# Patient Record
Sex: MALE | Race: WHITE | NOT HISPANIC OR LATINO | URBAN - METROPOLITAN AREA
[De-identification: names, ages, dates, MRNs, and addresses within clinical notes are randomized per-mention and may not be internally consistent; named-entity substitution may affect disease eponyms.]

---
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
## 2018-06-21 ENCOUNTER — APPOINTMENT (RX ONLY)
Dept: URBAN - METROPOLITAN AREA CLINIC 349 | Facility: CLINIC | Age: 18
Setting detail: DERMATOLOGY
End: 2018-06-21

## 2018-06-21 DIAGNOSIS — L70.0 ACNE VULGARIS: ICD-10-CM | Status: INADEQUATELY CONTROLLED

## 2018-06-21 DIAGNOSIS — L42 PITYRIASIS ROSEA: ICD-10-CM | Status: STABLE

## 2018-06-21 PROBLEM — L20.84 INTRINSIC (ALLERGIC) ECZEMA: Status: ACTIVE | Noted: 2018-06-21

## 2018-06-21 PROCEDURE — ? COUNSELING

## 2018-06-21 PROCEDURE — ? PRESCRIPTION

## 2018-06-21 PROCEDURE — ? TREATMENT REGIMEN

## 2018-06-21 PROCEDURE — 99213 OFFICE O/P EST LOW 20 MIN: CPT

## 2018-06-21 RX ORDER — MINOCYCLINE HYDROCHLORIDE 100 MG/1
CAPSULE ORAL
Qty: 30 | Refills: 2 | Status: ERX

## 2018-06-21 RX ORDER — TRETINOIN 0.6 MG/G
GEL TOPICAL
Qty: 1 | Refills: 2 | Status: ERX | COMMUNITY
Start: 2018-06-21

## 2018-06-21 RX ORDER — CLINDAMYCIN PHOSPHATE AND BENZOYL PEROXIDE 10; 37.5 MG/G; MG/G
GEL TOPICAL
Qty: 1 | Refills: 3 | Status: ERX | COMMUNITY
Start: 2018-06-21

## 2018-06-21 RX ADMIN — CLINDAMYCIN PHOSPHATE AND BENZOYL PEROXIDE: 10; 37.5 GEL TOPICAL at 14:07

## 2018-06-21 RX ADMIN — TRETINOIN: 0.6 GEL TOPICAL at 14:08

## 2018-06-21 ASSESSMENT — LOCATION ZONE DERM
LOCATION ZONE: ARM
LOCATION ZONE: FACE

## 2018-06-21 ASSESSMENT — LOCATION DETAILED DESCRIPTION DERM
LOCATION DETAILED: RIGHT INFERIOR LATERAL MALAR CHEEK
LOCATION DETAILED: RIGHT ANTERIOR PROXIMAL UPPER ARM
LOCATION DETAILED: LEFT ANTERIOR DISTAL UPPER ARM
LOCATION DETAILED: LEFT INFERIOR CENTRAL MALAR CHEEK

## 2018-06-21 ASSESSMENT — LOCATION SIMPLE DESCRIPTION DERM
LOCATION SIMPLE: LEFT UPPER ARM
LOCATION SIMPLE: RIGHT UPPER ARM
LOCATION SIMPLE: LEFT CHEEK
LOCATION SIMPLE: RIGHT CHEEK

## 2018-06-21 NOTE — PROCEDURE: COUNSELING
Birth Control Pills Pregnancy And Lactation Text: This medication should be avoided if pregnant and for the first 30 days post-partum.
Tazorac Pregnancy And Lactation Text: This medication is not safe during pregnancy. It is unknown if this medication is excreted in breast milk.
Benzoyl Peroxide Counseling: Patient counseled that medicine may cause skin irritation and bleach clothing.  In the event of skin irritation, the patient was advised to reduce the amount of the drug applied or use it less frequently.   The patient verbalized understanding of the proper use and possible adverse effects of benzoyl peroxide.  All of the patient's questions and concerns were addressed.
Dapsone Pregnancy And Lactation Text: This medication is Pregnancy Category C and is not considered safe during pregnancy or breast feeding.
Detail Level: Zone
Topical Clindamycin Pregnancy And Lactation Text: This medication is Pregnancy Category B and is considered safe during pregnancy. It is unknown if it is excreted in breast milk.
Azithromycin Counseling:  I discussed with the patient the risks of azithromycin including but not limited to GI upset, allergic reaction, drug rash, diarrhea, and yeast infections.
Isotretinoin Pregnancy And Lactation Text: This medication is Pregnancy Category X and is considered extremely dangerous during pregnancy. It is unknown if it is excreted in breast milk.
Topical Retinoid counseling:  Patient advised to apply a pea-sized amount only at bedtime and wait 30 minutes after washing their face before applying.  If too drying, patient may add a non-comedogenic moisturizer. The patient verbalized understanding of the proper use and possible adverse effects of retinoids.  All of the patient's questions and concerns were addressed.
Doxycycline Counseling:  Patient counseled regarding possible photosensitivity and increased risk for sunburn.  Patient instructed to avoid sunlight, if possible.  When exposed to sunlight, patients should wear protective clothing, sunglasses, and sunscreen.  The patient was instructed to call the office immediately if the following severe adverse effects occur:  hearing changes, easy bruising/bleeding, severe headache, or vision changes.  The patient verbalized understanding of the proper use and possible adverse effects of doxycycline.  All of the patient's questions and concerns were addressed.
Topical Retinoid Pregnancy And Lactation Text: This medication is Pregnancy Category C. It is unknown if this medication is excreted in breast milk.
Spironolactone Counseling: Patient advised regarding risks of diarrhea, abdominal pain, hyperkalemia, birth defects (for female patients), liver toxicity and renal toxicity. The patient may need blood work to monitor liver and kidney function and potassium levels while on therapy. The patient verbalized understanding of the proper use and possible adverse effects of spironolactone.  All of the patient's questions and concerns were addressed.
Bactrim Counseling:  I discussed with the patient the risks of sulfa antibiotics including but not limited to GI upset, allergic reaction, drug rash, diarrhea, dizziness, photosensitivity, and yeast infections.  Rarely, more serious reactions can occur including but not limited to aplastic anemia, agranulocytosis, methemoglobinemia, blood dyscrasias, liver or kidney failure, lung infiltrates or desquamative/blistering drug rashes.
Minocycline Counseling: Patient advised regarding possible photosensitivity and discoloration of the teeth, skin, lips, tongue and gums.  Patient instructed to avoid sunlight, if possible.  When exposed to sunlight, patients should wear protective clothing, sunglasses, and sunscreen.  The patient was instructed to call the office immediately if the following severe adverse effects occur:  hearing changes, easy bruising/bleeding, severe headache, or vision changes.  The patient verbalized understanding of the proper use and possible adverse effects of minocycline.  All of the patient's questions and concerns were addressed.
Spironolactone Pregnancy And Lactation Text: This medication can cause feminization of the male fetus and should be avoided during pregnancy. The active metabolite is also found in breast milk.
Tetracycline Pregnancy And Lactation Text: This medication is Pregnancy Category D and not consider safe during pregnancy. It is also excreted in breast milk.
Birth Control Pills Counseling: Birth Control Pill Counseling: I discussed with the patient the potential side effects of OCPs including but not limited to increased risk of stroke, heart attack, thrombophlebitis, deep venous thrombosis, hepatic adenomas, breast changes, GI upset, headaches, and depression.  The patient verbalized understanding of the proper use and possible adverse effects of OCPs. All of the patient's questions and concerns were addressed.
Use Enhanced Medication Counseling?: No
Topical Sulfur Applications Counseling: Topical Sulfur Counseling: Patient counseled that this medication may cause skin irritation or allergic reactions.  In the event of skin irritation, the patient was advised to reduce the amount of the drug applied or use it less frequently.   The patient verbalized understanding of the proper use and possible adverse effects of topical sulfur application.  All of the patient's questions and concerns were addressed.
Tetracycline Counseling: Patient counseled regarding possible photosensitivity and increased risk for sunburn.  Patient instructed to avoid sunlight, if possible.  When exposed to sunlight, patients should wear protective clothing, sunglasses, and sunscreen.  The patient was instructed to call the office immediately if the following severe adverse effects occur:  hearing changes, easy bruising/bleeding, severe headache, or vision changes.  The patient verbalized understanding of the proper use and possible adverse effects of tetracycline.  All of the patient's questions and concerns were addressed. Patient understands to avoid pregnancy while on therapy due to potential birth defects.
Topical Sulfur Applications Pregnancy And Lactation Text: This medication is Pregnancy Category C and has an unknown safety profile during pregnancy. It is unknown if this topical medication is excreted in breast milk.
Dapsone Counseling: I discussed with the patient the risks of dapsone including but not limited to hemolytic anemia, agranulocytosis, rashes, methemoglobinemia, kidney failure, peripheral neuropathy, headaches, GI upset, and liver toxicity.  Patients who start dapsone require monitoring including baseline LFTs and weekly CBCs for the first month, then every month thereafter.  The patient verbalized understanding of the proper use and possible adverse effects of dapsone.  All of the patient's questions and concerns were addressed.
High Dose Vitamin A Counseling: Side effects reviewed, pt to contact office should one occur.
Doxycycline Pregnancy And Lactation Text: This medication is Pregnancy Category D and not consider safe during pregnancy. It is also excreted in breast milk but is considered safe for shorter treatment courses.
High Dose Vitamin A Pregnancy And Lactation Text: High dose vitamin A therapy is contraindicated during pregnancy and breast feeding.
Topical Clindamycin Counseling: Patient counseled that this medication may cause skin irritation or allergic reactions.  In the event of skin irritation, the patient was advised to reduce the amount of the drug applied or use it less frequently.   The patient verbalized understanding of the proper use and possible adverse effects of clindamycin.  All of the patient's questions and concerns were addressed.
Benzoyl Peroxide Pregnancy And Lactation Text: This medication is Pregnancy Category C. It is unknown if benzoyl peroxide is excreted in breast milk.
Azithromycin Pregnancy And Lactation Text: This medication is considered safe during pregnancy and is also secreted in breast milk.
Erythromycin Counseling:  I discussed with the patient the risks of erythromycin including but not limited to GI upset, allergic reaction, drug rash, diarrhea, increase in liver enzymes, and yeast infections.
Tazorac Counseling:  Patient advised that medication is irritating and drying.  Patient may need to apply sparingly and wash off after an hour before eventually leaving it on overnight.  The patient verbalized understanding of the proper use and possible adverse effects of tazorac.  All of the patient's questions and concerns were addressed.
Isotretinoin Counseling: Patient should get monthly blood tests, not donate blood, not drive at night if vision affected, not share medication, and not undergo elective surgery for 6 months after tx completed. Side effects reviewed, pt to contact office should one occur.
Erythromycin Pregnancy And Lactation Text: This medication is Pregnancy Category B and is considered safe during pregnancy. It is also excreted in breast milk.
Bactrim Pregnancy And Lactation Text: This medication is Pregnancy Category D and is known to cause fetal risk.  It is also excreted in breast milk.

## 2018-08-15 ENCOUNTER — APPOINTMENT (RX ONLY)
Dept: URBAN - METROPOLITAN AREA CLINIC 349 | Facility: CLINIC | Age: 18
Setting detail: DERMATOLOGY
End: 2018-08-15

## 2018-08-15 DIAGNOSIS — L42 PITYRIASIS ROSEA: ICD-10-CM

## 2018-08-15 DIAGNOSIS — L70.0 ACNE VULGARIS: ICD-10-CM

## 2018-08-15 PROCEDURE — ? PRESCRIPTION

## 2018-08-15 PROCEDURE — ? TREATMENT REGIMEN

## 2018-08-15 PROCEDURE — 99213 OFFICE O/P EST LOW 20 MIN: CPT

## 2018-08-15 PROCEDURE — ? COUNSELING

## 2018-08-15 RX ORDER — CLINDAMYCIN PHOSPHATE AND BENZOYL PEROXIDE 10; 37.5 MG/G; MG/G
GEL TOPICAL
Qty: 1 | Refills: 3 | Status: ERX

## 2018-08-15 RX ORDER — TRETINOIN 0.6 MG/G
GEL TOPICAL
Qty: 1 | Refills: 2 | Status: ERX

## 2018-08-15 RX ORDER — MINOCYCLINE HYDROCHLORIDE 100 MG/1
CAPSULE ORAL
Qty: 30 | Refills: 2 | Status: ERX

## 2018-08-15 ASSESSMENT — LOCATION SIMPLE DESCRIPTION DERM
LOCATION SIMPLE: LEFT UPPER ARM
LOCATION SIMPLE: RIGHT CHEEK
LOCATION SIMPLE: LEFT CHEEK
LOCATION SIMPLE: RIGHT UPPER ARM

## 2018-08-15 ASSESSMENT — LOCATION DETAILED DESCRIPTION DERM
LOCATION DETAILED: LEFT ANTERIOR DISTAL UPPER ARM
LOCATION DETAILED: RIGHT INFERIOR LATERAL MALAR CHEEK
LOCATION DETAILED: RIGHT ANTERIOR PROXIMAL UPPER ARM
LOCATION DETAILED: LEFT INFERIOR CENTRAL MALAR CHEEK

## 2018-08-15 ASSESSMENT — LOCATION ZONE DERM
LOCATION ZONE: FACE
LOCATION ZONE: ARM

## 2018-08-15 NOTE — PROCEDURE: COUNSELING
Tazorac Pregnancy And Lactation Text: This medication is not safe during pregnancy. It is unknown if this medication is excreted in breast milk.
Topical Sulfur Applications Counseling: Topical Sulfur Counseling: Patient counseled that this medication may cause skin irritation or allergic reactions.  In the event of skin irritation, the patient was advised to reduce the amount of the drug applied or use it less frequently.   The patient verbalized understanding of the proper use and possible adverse effects of topical sulfur application.  All of the patient's questions and concerns were addressed.
Minocycline Pregnancy And Lactation Text: This medication is Pregnancy Category D and not consider safe during pregnancy. It is also excreted in breast milk.
Minocycline Counseling: Patient advised regarding possible photosensitivity and discoloration of the teeth, skin, lips, tongue and gums.  Patient instructed to avoid sunlight, if possible.  When exposed to sunlight, patients should wear protective clothing, sunglasses, and sunscreen.  The patient was instructed to call the office immediately if the following severe adverse effects occur:  hearing changes, easy bruising/bleeding, severe headache, or vision changes.  The patient verbalized understanding of the proper use and possible adverse effects of minocycline.  All of the patient's questions and concerns were addressed.
Azithromycin Counseling:  I discussed with the patient the risks of azithromycin including but not limited to GI upset, allergic reaction, drug rash, diarrhea, and yeast infections.
Use Enhanced Medication Counseling?: No
Spironolactone Pregnancy And Lactation Text: This medication can cause feminization of the male fetus and should be avoided during pregnancy. The active metabolite is also found in breast milk.
Tazorac Counseling:  Patient advised that medication is irritating and drying.  Patient may need to apply sparingly and wash off after an hour before eventually leaving it on overnight.  The patient verbalized understanding of the proper use and possible adverse effects of tazorac.  All of the patient's questions and concerns were addressed.
Erythromycin Pregnancy And Lactation Text: This medication is Pregnancy Category B and is considered safe during pregnancy. It is also excreted in breast milk.
Bactrim Pregnancy And Lactation Text: This medication is Pregnancy Category D and is known to cause fetal risk.  It is also excreted in breast milk.
Detail Level: Zone
Azithromycin Pregnancy And Lactation Text: This medication is considered safe during pregnancy and is also secreted in breast milk.
Dapsone Pregnancy And Lactation Text: This medication is Pregnancy Category C and is not considered safe during pregnancy or breast feeding.
Erythromycin Counseling:  I discussed with the patient the risks of erythromycin including but not limited to GI upset, allergic reaction, drug rash, diarrhea, increase in liver enzymes, and yeast infections.
High Dose Vitamin A Counseling: Side effects reviewed, pt to contact office should one occur.
Topical Retinoid Pregnancy And Lactation Text: This medication is Pregnancy Category C. It is unknown if this medication is excreted in breast milk.
Topical Retinoid counseling:  Patient advised to apply a pea-sized amount only at bedtime and wait 30 minutes after washing their face before applying.  If too drying, patient may add a non-comedogenic moisturizer. The patient verbalized understanding of the proper use and possible adverse effects of retinoids.  All of the patient's questions and concerns were addressed.
Isotretinoin Pregnancy And Lactation Text: This medication is Pregnancy Category X and is considered extremely dangerous during pregnancy. It is unknown if it is excreted in breast milk.
High Dose Vitamin A Pregnancy And Lactation Text: High dose vitamin A therapy is contraindicated during pregnancy and breast feeding.
Topical Sulfur Applications Pregnancy And Lactation Text: This medication is Pregnancy Category C and has an unknown safety profile during pregnancy. It is unknown if this topical medication is excreted in breast milk.
Doxycycline Pregnancy And Lactation Text: This medication is Pregnancy Category D and not consider safe during pregnancy. It is also excreted in breast milk but is considered safe for shorter treatment courses.
Dapsone Counseling: I discussed with the patient the risks of dapsone including but not limited to hemolytic anemia, agranulocytosis, rashes, methemoglobinemia, kidney failure, peripheral neuropathy, headaches, GI upset, and liver toxicity.  Patients who start dapsone require monitoring including baseline LFTs and weekly CBCs for the first month, then every month thereafter.  The patient verbalized understanding of the proper use and possible adverse effects of dapsone.  All of the patient's questions and concerns were addressed.
Topical Clindamycin Counseling: Patient counseled that this medication may cause skin irritation or allergic reactions.  In the event of skin irritation, the patient was advised to reduce the amount of the drug applied or use it less frequently.   The patient verbalized understanding of the proper use and possible adverse effects of clindamycin.  All of the patient's questions and concerns were addressed.
Doxycycline Counseling:  Patient counseled regarding possible photosensitivity and increased risk for sunburn.  Patient instructed to avoid sunlight, if possible.  When exposed to sunlight, patients should wear protective clothing, sunglasses, and sunscreen.  The patient was instructed to call the office immediately if the following severe adverse effects occur:  hearing changes, easy bruising/bleeding, severe headache, or vision changes.  The patient verbalized understanding of the proper use and possible adverse effects of doxycycline.  All of the patient's questions and concerns were addressed.
Benzoyl Peroxide Counseling: Patient counseled that medicine may cause skin irritation and bleach clothing.  In the event of skin irritation, the patient was advised to reduce the amount of the drug applied or use it less frequently.   The patient verbalized understanding of the proper use and possible adverse effects of benzoyl peroxide.  All of the patient's questions and concerns were addressed.
Benzoyl Peroxide Pregnancy And Lactation Text: This medication is Pregnancy Category C. It is unknown if benzoyl peroxide is excreted in breast milk.
Topical Clindamycin Pregnancy And Lactation Text: This medication is Pregnancy Category B and is considered safe during pregnancy. It is unknown if it is excreted in breast milk.
Isotretinoin Counseling: Patient should get monthly blood tests, not donate blood, not drive at night if vision affected, not share medication, and not undergo elective surgery for 6 months after tx completed. Side effects reviewed, pt to contact office should one occur.
Birth Control Pills Counseling: Birth Control Pill Counseling: I discussed with the patient the potential side effects of OCPs including but not limited to increased risk of stroke, heart attack, thrombophlebitis, deep venous thrombosis, hepatic adenomas, breast changes, GI upset, headaches, and depression.  The patient verbalized understanding of the proper use and possible adverse effects of OCPs. All of the patient's questions and concerns were addressed.
Spironolactone Counseling: Patient advised regarding risks of diarrhea, abdominal pain, hyperkalemia, birth defects (for female patients), liver toxicity and renal toxicity. The patient may need blood work to monitor liver and kidney function and potassium levels while on therapy. The patient verbalized understanding of the proper use and possible adverse effects of spironolactone.  All of the patient's questions and concerns were addressed.
Bactrim Counseling:  I discussed with the patient the risks of sulfa antibiotics including but not limited to GI upset, allergic reaction, drug rash, diarrhea, dizziness, photosensitivity, and yeast infections.  Rarely, more serious reactions can occur including but not limited to aplastic anemia, agranulocytosis, methemoglobinemia, blood dyscrasias, liver or kidney failure, lung infiltrates or desquamative/blistering drug rashes.
Tetracycline Counseling: Patient counseled regarding possible photosensitivity and increased risk for sunburn.  Patient instructed to avoid sunlight, if possible.  When exposed to sunlight, patients should wear protective clothing, sunglasses, and sunscreen.  The patient was instructed to call the office immediately if the following severe adverse effects occur:  hearing changes, easy bruising/bleeding, severe headache, or vision changes.  The patient verbalized understanding of the proper use and possible adverse effects of tetracycline.  All of the patient's questions and concerns were addressed. Patient understands to avoid pregnancy while on therapy due to potential birth defects.
Birth Control Pills Pregnancy And Lactation Text: This medication should be avoided if pregnant and for the first 30 days post-partum.

## 2018-09-19 LAB
CHOLEST SERPL-MCNC: 166 MG/DL (ref 100–169)
CHOLEST/HDLC SERPL: 4.3 RATIO (ref 0–5)
GLUCOSE SERPL-MCNC: 87 MG/DL (ref 65–99)
HBA1C MFR BLD: 5.4 % (ref 4.8–5.6)
HDLC SERPL-MCNC: 39 MG/DL
LDLC SERPL CALC-MCNC: 113 MG/DL (ref 0–109)
TRIGL SERPL-MCNC: 69 MG/DL (ref 0–89)
VLDLC SERPL CALC-MCNC: 14 MG/DL (ref 5–40)

## 2020-05-28 ENCOUNTER — APPOINTMENT (RX ONLY)
Dept: URBAN - METROPOLITAN AREA CLINIC 349 | Facility: CLINIC | Age: 20
Setting detail: DERMATOLOGY
End: 2020-05-28

## 2020-05-28 DIAGNOSIS — L73.8 OTHER SPECIFIED FOLLICULAR DISORDERS: ICD-10-CM

## 2020-05-28 PROBLEM — L70.0 ACNE VULGARIS: Status: ACTIVE | Noted: 2020-05-28

## 2020-05-28 PROCEDURE — ? TREATMENT REGIMEN

## 2020-05-28 PROCEDURE — ? PRESCRIPTION

## 2020-05-28 PROCEDURE — 99213 OFFICE O/P EST LOW 20 MIN: CPT

## 2020-05-28 PROCEDURE — ? COUNSELING

## 2020-05-28 RX ORDER — CLINDAMYCIN PHOSPHATE 10 MG/ML
SOLUTION TOPICAL
Qty: 1 | Refills: 2 | Status: ERX | COMMUNITY
Start: 2020-05-28

## 2020-05-28 RX ADMIN — CLINDAMYCIN PHOSPHATE: 10 SOLUTION TOPICAL at 00:00

## 2020-05-28 ASSESSMENT — LOCATION ZONE DERM: LOCATION ZONE: TRUNK

## 2020-05-28 ASSESSMENT — LOCATION SIMPLE DESCRIPTION DERM: LOCATION SIMPLE: GROIN

## 2020-05-28 ASSESSMENT — LOCATION DETAILED DESCRIPTION DERM: LOCATION DETAILED: SUPRAPUBIC SKIN

## 2021-01-04 ENCOUNTER — HOSPITAL ENCOUNTER (OUTPATIENT)
Dept: PHYSICAL THERAPY | Age: 21
Discharge: HOME | End: 2021-01-04
Payer: COMMERCIAL

## 2021-01-04 PROCEDURE — 97110 THERAPEUTIC EXERCISES: CPT

## 2021-01-04 PROCEDURE — 97162 PT EVAL MOD COMPLEX 30 MIN: CPT

## 2021-01-04 NOTE — THERAPY EVALUATION
Edmundo Andrews : 2000 Primary: [facility]* Secondary:  [address] @ [address], [address] [place] [address] [phone]   [phone] OUTPATIENT PHYSICAL THERAPY:Initial Assessment 2021 ICD-10: Treatment Diagnosis: Pain in right hip (M25.551), Stiffness of right hip, not elsewhere classified (M25.651), Pain in left hip (M25.552), Stiffness of left hip, not elsewhere classified (M25.652), Pain in right knee (M25.561) and Pain in left knee (M25.562) Precautions/Allergies:  
Patient has no allergy information on record. Ambulatory/Rehab Services H2 Model Falls Risk Assessment Risk Factors: 
     (1)  Gender [Male] Ability to Rise from Chair: 
     (0)  Ability to rise in a single movement Falls Prevention Plan: No modifications necessary Total: (5 or greater = High Risk): 1  [facility] [place] [employer] Patent #[id number]. Federal Law prohibits the replication, distribution or use without written permission from [facility] [employer] MEDICAL/REFERRING DIAGNOSIS: 
Pain in right knee [M25.561] Pain in left knee [M25.562] DATE OF ONSET: 20 REFERRING PHYSICIAN: Ponce Murdock MD MD Orders: evaluate and treat Return MD Appointment: TBD INITIAL ASSESSMENT:  Mr. Samuel Pablo presents with impaired strength, ROM and pain of bilateral hips and knees, L more painful than R . This limits sitting, lifting and ambulatory tolerance and restricts ability to participate in ADLs, functional mobility and recreational activities. . Patient would benefit from skilled physical therapy to address above impairments. PROBLEM LIST (Impacting functional limitations): 1. Decreased Strength 2. Decreased ADL/Functional Activities 3. Increased Pain 4. Decreased Activity Tolerance 5. Decreased Flexibility/Joint Mobility INTERVENTIONS PLANNED: (Treatment may consist of any combination of the following) 1. Cryotherapy 2. Electrical Stimulation 3. Gait Training 4. Heat 5. Home Exercise Program (HEP) 6. Manual Therapy 7. Neuromuscular Re-education/Strengthening 8. Therapeutic Activites 9. Therapeutic Exercise/Strengthening TREATMENT PLAN: 
Effective Dates: 1/4/2021 TO 2/3/2021 (30 days). Frequency/Duration: 2 times a week for 30 Day(s) GOALS: (Goals have been discussed and agreed upon with patient.) Short-Term Functional Goals: Time Frame: 2 weeks # Goal Status 1 Pt will confirm compliance with home program to facilitate improvement in function. NEW Discharge goals: Time frame: 4 weeks # Goal Status 1 Pt will be able to tolerate sitting for at least 1 hour with no significant increase in symptoms to participate in ADLs such as desk work and reading. NEW  
2 Pt will be able to pick objects up from ground without significant increase in symptoms to be able to clean. NEW  
3 Pt will tolerate all LE active movements with no pain to be able to perform ADLs and functional mobility without limitations. NEW Rehabilitation Potential For Stated Goals: Excellent Regarding Jose Mckeon's therapy, I certify that the treatment plan above will be carried out by a therapist or under their direction. Thank you for this referral, Vanessa Patel, PT, DPT Referring Physician Signature: Collette Bourne MD _______________________________ Date _____________ HISTORY:  
History of Injury/Illness (Reason for Referral): 
 Pt reports around Thanksgiving 2020, he had to move a lot of furniture for his family since they were having floors re-done. He reports after this, both of his hip and knees hurt. He reports pain is in medial hips near groin and then medial aspect of thigh then anterior knee. He reports it generally switches from leg to leg with L leg hurting more today. He had x-rays which were (-) and checked for hernia which was also (-). He sometimes will pop hips by stretching into flexion then extension which feels good and relieves tension. He reports most pain is from prolonged sitting. He denies radicular symptoms or symptoms of hip catching or locking. He would like to get back to normal mobility and ADLs as well as being able to run 1-2/week. Past Medical History/Comorbidities:  
Mr. Julio Cerna  has no past medical history on file. Mr. Julio Cerna  has no past surgical history on file. Social History/Living Environment:  
  Pt lives with family in two-story home with stairs in home. Prior Level of Function/Work/Activity: 
Pt is a student. Pt had unrestricted prior level of function. Current Medications: No current outpatient medications on file. Date Last Reviewed:  1/4/2021 Number of Personal Factors/Comorbidities that affect the Plan of Care: 1-2: MODERATE COMPLEXITY EXAMINATION:  
Pain at worst: 5/10 Observation: Gait: WNL and no antalgic gait ROM: B knee flexion and extension WNL. B hip flexion and extension WNL, pain with B JANETTE and mild pain on R with hip IR in extension. Lower body ANDT: All tests WNL B Strength: 5/5 throughout though mild pain with B knee extension and B hip IR. Pt reported SLR had a slight ache to it. Pt had B knee pain with deep squat. Palpation: B patella mobility WNL. Body Structures Involved: 1. Joints 2. Muscles Body Functions Affected: 1. Sensory/Pain 2. Neuromusculoskeletal 
3. Movement Related Activities and Participation Affected: 1. General Tasks and Demands 2. Mobility 3. Self Care 4. Community, Social and [place] Number of elements (examined above) that affect the Plan of Care: 3: MODERATE COMPLEXITY CLINICAL PRESENTATION:  
Presentation: Evolving clinical presentation with changing clinical characteristics: MODERATE COMPLEXITY CLINICAL DECISION MAKING:  
  
OUTCOME MEASURE:  
Initial outcome measure performed on 1/4/2021. Tool Used: Lower Extremity Functional Scale (LEFS) Score:  Initial: 70/80 Most Recent: X/80 (Date: -- ) Interpretation of Score: 20 questions each scored on a 5 point scale with 0 representing \"extreme difficulty or unable to perform\" and 4 representing \"no difficulty\". The lower the score, the greater the functional disability. 80/80 represents no disability. Minimal detectable change is 9 points. MEDICAL NECESSITY:  
· Patient will benefit from skilled physical therapy to address their previously listed impairments in order to improve their independence with functional activities painfree. REASON FOR SERVICES/OTHER COMMENTS: 
· Patient requires present interventions due to patient's inability to perform functional and recreational activities painfree. Use of outcome tool(s) and clinical judgement create a POC that gives a: Questionable prediction of patient's progress: MODERATE COMPLEXITY Total Duration: 39 min PT Patient Time In/Time Out Time In: 8267 Time Out: 1843 Jeniffer García, PT, DPT

## 2021-01-04 NOTE — PROGRESS NOTES
Elin De La Rosa  : 2000  [facility]  Secondary:  [address] @ [address]  [phone]   [phone]      OUTPATIENT PHYSICAL THERAPY: Daily Treatment Note 2021  Visit Count:  1    ICD-10: Treatment Diagnosis: Pain in right hip (M25.551), Stiffness of right hip, not elsewhere classified (M25.651), Pain in left hip (M25.552), Stiffness of left hip, not elsewhere classified (M25.652), Pain in right knee (M25.561) and Pain in left knee (M25.562)  TREATMENT PLAN:  Effective Dates: 2021 TO 2/3/2021 (30 days). Frequency/Duration: 2 times a week for 30 Day(s)  GOALS: (Goals have been discussed and agreed upon with patient.)  Short-Term Functional Goals: Time Frame: 2 weeks  # Goal Status   1 Pt will confirm compliance with home program to facilitate improvement in function. NEW     Discharge goals: Time frame: 4 weeks   # Goal Status   1 Pt will be able to tolerate sitting for at least 1 hour with no significant increase in symptoms to participate in ADLs such as desk work and reading. NEW   2 Pt will be able to pick objects up from ground without significant increase in symptoms to be able to clean. NEW   3 Pt will tolerate all LE active movements with no pain to be able to perform ADLs and functional mobility without limitations. NEW       Pre-treatment Symptoms/Complaints:  Pt reports mild pain affecting L hip and anterior knee right now. Pain: Initial:   310 Post Session:  No increase   Medications Last Reviewed: 2021  Updated Objective Findings: Below measures from initial evaluation unless otherwise noted. 21  Pain at worst: 5/10  Observation: Gait: WNL and no antalgic gait  ROM: B knee flexion and extension WNL. B hip flexion and extension WNL, pain with B JANETTE and mild pain on R with hip IR in extension.    Lower body ANDT: All tests WNL B  Strength: 5/5 throughout though mild pain with B knee extension and B hip IR. Pt reported SLR had a slight ache to it. Pt had B knee pain with deep squat. Palpation: B patella mobility WNL. LEFS: 70/80    TREATMENT:   GAIT TRAINING: (see chart for minutes):  Gait training to improve and/or restore physical functioning as related to mobility and balance. THERAPEUTIC ACTIVITY: (see chart for minutes): Therapeutic activities per grid below to improve mobility, strength, balance and coordination. Required minimal visual, verbal and tactile cues to improve independence with functional mobility and activities. THERAPEUTIC EXERCISE: (see chart for minutes):  Exercises per grid below to improve mobility, strength, balance and coordination. Required minimal visual, verbal and tactile cues to promote proper body alignment and promote proper body mechanics. Progressed resistance, range, repetitions and complexity of movement as indicated. NEUROMUSCULAR RE-EDUCATION: (see chart for minutes):  Exercise/activities per grid below to improve balance, coordination, kinesthetic sense, posture, pain, and proprioception. Required minimal visual, verbal and tactile cues to promote motor control of bilateral, lower extremity(s). MANUAL THERAPY: (see chart for minutes): Joint mobilization, Soft tissue mobilization, skin mobilization, and muscle energy techniques were utilized and necessary because of the patient's restricted joint motion, restricted motion of soft tissue and pain . MODALITIES: (see chart for minutes):      *  Cold Pack Therapy in order to provide analgesia.      Date: 1/4/21 (visit 1)       Modalities:                Therapeutic Exercise: 25 min        SLR: x5 B, x10 B  Bridge: x5, x10   SKTC: x1 hold B   Hip abduction: x5, x10 B  Reverse clam: x5 B  Active lunge stretch: x3 B  Hip extension: x5   Hip adductor stretch: x1 hold B   Pt educated on taking standing breaks from prolonged sitting  Pt educated on home program implementation and given printout. Neuromuscular re-education                Manual Therapy:                Therapeutic Activities:                  Home program: 1/4/20: bridge, hip abduction, active lunge stretch, hip adductor stretch, SKTC    [employer] Portal  Treatment/Session Summary:    · Response to Treatment: Pt tolerated exercises and reported they were moderately difficult. · Communication/Consultation:  None today  · Equipment provided today: None  · Recommendations/Intent for next treatment session: Next visit will focus on improving B hip and knee pain and strength.     Total Treatment Billable Duration:  39 minutes  PT Patient Time In/Time Out  Time In: 1450  Time Out: 622 [address], PT, DPT    Future Appointments   Date Time Provider Andreina Anaya   1/7/2021  2:00 PM Chris Blackwell [facility]   1/12/2021  2:00 PM Chris Blackwell [facility]   1/14/2021  2:00 PM Chris Blackwell [facility]   1/19/2021  2:00 PM Dallas Rosalva [facility]   1/21/2021  2:00 PM Dallas Rosalva [facility]   1/26/2021  2:00 PM Dallas Rosalva [facility]   1/28/2021  2:00 PM Dallas Rosalva [facility]

## 2021-01-07 ENCOUNTER — HOSPITAL ENCOUNTER (OUTPATIENT)
Dept: PHYSICAL THERAPY | Age: 21
Discharge: HOME | End: 2021-01-07
Payer: COMMERCIAL

## 2021-01-07 PROCEDURE — 97110 THERAPEUTIC EXERCISES: CPT

## 2021-01-12 ENCOUNTER — HOSPITAL ENCOUNTER (OUTPATIENT)
Dept: PHYSICAL THERAPY | Age: 21
Discharge: HOME | End: 2021-01-12
Payer: COMMERCIAL

## 2021-01-12 PROCEDURE — 97110 THERAPEUTIC EXERCISES: CPT

## 2021-01-12 NOTE — PROGRESS NOTES
Roberto Gonsalez  : 2000  [facility]  Secondary:  [address] @ [address], [address] [place] [address]  [phone]   [phone]      OUTPATIENT PHYSICAL THERAPY: Daily Treatment Note 2021  Visit Count:  3    ICD-10: Treatment Diagnosis: Pain in right hip (M25.551), Stiffness of right hip, not elsewhere classified (M25.651), Pain in left hip (M25.552), Stiffness of left hip, not elsewhere classified (M25.652), Pain in right knee (M25.561) and Pain in left knee (M25.562)  TREATMENT PLAN:  Effective Dates: 2021 TO 2/3/2021 (30 days). Frequency/Duration: 2 times a week for 30 Day(s)  GOALS: (Goals have been discussed and agreed upon with patient.)  Short-Term Functional Goals: Time Frame: 2 weeks  # Goal Status   1 Pt will confirm compliance with home program to facilitate improvement in function. NEW     Discharge goals: Time frame: 4 weeks   # Goal Status   1 Pt will be able to tolerate sitting for at least 1 hour with no significant increase in symptoms to participate in ADLs such as desk work and reading. NEW   2 Pt will be able to pick objects up from ground without significant increase in symptoms to be able to clean. NEW   3 Pt will tolerate all LE active movements with no pain to be able to perform ADLs and functional mobility without limitations. NEW       Pre-treatment Symptoms/Complaints:  Pt reports he can tell improvement in his hips. He reports the area that they hurt is in reduced to a smaller region though it is more intense in this area. Pain: Initial:   Pt reported hips feeling okay at rest Post Session:  No increase   Medications Last Reviewed: 2021  Updated Objective Findings: Below measures from initial evaluation unless otherwise noted. 21  Pain at worst: 5/10  Observation: Gait: WNL and no antalgic gait  ROM: B knee flexion and extension WNL.  B hip flexion and extension WNL, pain with B JANETTE and mild pain on R with hip IR in extension. Lower body ANDT: All tests WNL B  Strength: 5/5 throughout though mild pain with B knee extension and B hip IR. Pt reported SLR had a slight ache to it. Pt had B knee pain with deep squat. Palpation: B patella mobility WNL. LEFS: 70/80    TREATMENT:   GAIT TRAINING: (see chart for minutes):  Gait training to improve and/or restore physical functioning as related to mobility and balance. THERAPEUTIC ACTIVITY: (see chart for minutes): Therapeutic activities per grid below to improve mobility, strength, balance and coordination. Required minimal visual, verbal and tactile cues to improve independence with functional mobility and activities. THERAPEUTIC EXERCISE: (see chart for minutes):  Exercises per grid below to improve mobility, strength, balance and coordination. Required minimal visual, verbal and tactile cues to promote proper body alignment and promote proper body mechanics. Progressed resistance, range, repetitions and complexity of movement as indicated. NEUROMUSCULAR RE-EDUCATION: (see chart for minutes):  Exercise/activities per grid below to improve balance, coordination, kinesthetic sense, posture, pain, and proprioception. Required minimal visual, verbal and tactile cues to promote motor control of bilateral, lower extremity(s). MANUAL THERAPY: (see chart for minutes): Joint mobilization, Soft tissue mobilization, skin mobilization, and muscle energy techniques were utilized and necessary because of the patient's restricted joint motion, restricted motion of soft tissue and pain . MODALITIES: (see chart for minutes):      *  Cold Pack Therapy in order to provide analgesia.      Date: 1/4/21 (visit 1) 1/7/21 (visit 2)  1/12/21 (visit 3)      Modalities:                Therapeutic Exercise: 25 min 40 min 41 min      SLR: x5 B, x10 B  Bridge: x5, x10   SKTC: x1 hold B   Hip abduction: x5, x10 B  Reverse clam: x5 B  Active lunge stretch: x3 B  Hip extension: x5   Hip adductor stretch: x1 hold B   Pt educated on taking standing breaks from prolonged sitting  Pt educated on home program implementation and given printout. Bike: 5'   SLR: x10 B  Bridge: x10  SL bridge: x5 B  Marching bridge: x5   Hip abduction: 2x10 B   Dead lift: 2x5x65#  Sumo DL: 2x5x65# with cues to 'spread the floor' which he reported felt better  Pt educated on lifting mechanics including holding the weight close. SL RDL: 2x4 B   Band walk with 13/16 band: 2x1 lap each with knees straight, knees bent, and monster walk Bike: 5'   SLR: x10 B  Bridge: x10  Marching bridge: 2x6   Hip abduction: x10 B   Side plank from knees: x5 B, x3 with 3 reps of clam each. Pt educated to add to home program.  Dead lift: x5x65#, 5x65# with squat lift, 5x65# with snatch   Sumo DL: 5x65# with cues to 'spread the floor'   SL RDL: 2x5 B   Side lunge: x5 B, x5 with slider  Hip adductor stretch: x2 contract relax in seated     Neuromuscular re-education                Manual Therapy:                Therapeutic Activities:                  Home program: 1/4/20: bridge, hip abduction, active lunge stretch, hip adductor stretch, SKTC    [facility] Portal  Treatment/Session Summary:    · Response to Treatment: Pt progressing well and advanced to side plank + clam and side lunges. He was educated on additional lifting strategies. · Communication/Consultation:  None today  · Equipment provided today: None  · Recommendations/Intent for next treatment session: Next visit will focus on improving B hip and knee pain and strength.     Total Treatment Billable Duration:  41 minutes  PT Patient Time In/Time Out  Time In: 7205  Time Out: Gurjit Mott PT, DPT    Future Appointments   Date Time Provider Andreina Anaya   1/14/2021  2:00 PM Joseph Cameron [facility]   1/19/2021  2:00 PM Joseph TYLER [facility]   1/21/2021  2:00 PM Joseph TYLER [facility]   1/26/2021  2:00 PM Joseph DAVEMATTHIEU [facility]   1/28/2021  2:00 PM Stacie Thompson [facility]

## 2021-01-14 ENCOUNTER — HOSPITAL ENCOUNTER (OUTPATIENT)
Dept: PHYSICAL THERAPY | Age: 21
Discharge: HOME | End: 2021-01-14
Payer: COMMERCIAL

## 2021-01-14 PROCEDURE — 97110 THERAPEUTIC EXERCISES: CPT

## 2021-01-14 NOTE — PROGRESS NOTES
Kiera Bañuelos  : 2000  [facility]  Secondary:  [address] @ [address], [address] [place] [address]  [phone]   [phone]      OUTPATIENT PHYSICAL THERAPY: Daily Treatment Note 2021  Visit Count:  4    ICD-10: Treatment Diagnosis: Pain in right hip (M25.551), Stiffness of right hip, not elsewhere classified (M25.651), Pain in left hip (M25.552), Stiffness of left hip, not elsewhere classified (M25.652), Pain in right knee (M25.561) and Pain in left knee (M25.562)  TREATMENT PLAN:  Effective Dates: 2021 TO 2/3/2021 (30 days). Frequency/Duration: 2 times a week for 30 Day(s)  GOALS: (Goals have been discussed and agreed upon with patient.)  Short-Term Functional Goals: Time Frame: 2 weeks  # Goal Status   1 Pt will confirm compliance with home program to facilitate improvement in function. NEW     Discharge goals: Time frame: 4 weeks   # Goal Status   1 Pt will be able to tolerate sitting for at least 1 hour with no significant increase in symptoms to participate in ADLs such as desk work and reading. NEW   2 Pt will be able to pick objects up from ground without significant increase in symptoms to be able to clean. NEW   3 Pt will tolerate all LE active movements with no pain to be able to perform ADLs and functional mobility without limitations. NEW       Pre-treatment Symptoms/Complaints:  Pt reports his hips actually did not hurt at all yesterday. He reports he has continued his exercises as well. Pain: Initial:   Pt reports no pain at rest  Post Session:  Pt reported his legs feel sore, but a good exercise sore   Medications Last Reviewed: 2021  Updated Objective Findings: Below measures from initial evaluation unless otherwise noted. 21  Pain at worst: 5/10  Observation: Gait: WNL and no antalgic gait  ROM: B knee flexion and extension WNL.  B hip flexion and extension WNL, pain with B JANETTE and mild pain on R with hip IR in extension. Lower body ANDT: All tests WNL B  Strength: 5/5 throughout though mild pain with B knee extension and B hip IR. Pt reported SLR had a slight ache to it. Pt had B knee pain with deep squat. Palpation: B patella mobility WNL. LEFS: 70/80    TREATMENT:   GAIT TRAINING: (see chart for minutes):  Gait training to improve and/or restore physical functioning as related to mobility and balance. THERAPEUTIC ACTIVITY: (see chart for minutes): Therapeutic activities per grid below to improve mobility, strength, balance and coordination. Required minimal visual, verbal and tactile cues to improve independence with functional mobility and activities. THERAPEUTIC EXERCISE: (see chart for minutes):  Exercises per grid below to improve mobility, strength, balance and coordination. Required minimal visual, verbal and tactile cues to promote proper body alignment and promote proper body mechanics. Progressed resistance, range, repetitions and complexity of movement as indicated. NEUROMUSCULAR RE-EDUCATION: (see chart for minutes):  Exercise/activities per grid below to improve balance, coordination, kinesthetic sense, posture, pain, and proprioception. Required minimal visual, verbal and tactile cues to promote motor control of bilateral, lower extremity(s). MANUAL THERAPY: (see chart for minutes): Joint mobilization, Soft tissue mobilization, skin mobilization, and muscle energy techniques were utilized and necessary because of the patient's restricted joint motion, restricted motion of soft tissue and pain . MODALITIES: (see chart for minutes):      *  Cold Pack Therapy in order to provide analgesia.      Date: 1/4/21 (visit 1) 1/7/21 (visit 2)  1/12/21 (visit 3)  1/14/21 (visit 4)    Modalities:                Therapeutic Exercise: 25 min 40 min 41 min 40 min     SLR: x5 B, x10 B  Bridge: x5, x10   SKTC: x1 hold B   Hip abduction: x5, x10 B  Reverse clam: x5 B  Active lunge stretch: x3 B  Hip extension: x5   Hip adductor stretch: x1 hold B   Pt educated on taking standing breaks from prolonged sitting  Pt educated on home program implementation and given printout. Bike: 5'   SLR: x10 B  Bridge: x10  SL bridge: x5 B  Marching bridge: x5   Hip abduction: 2x10 B   Dead lift: 2x5x65#  Sumo DL: 2x5x65# with cues to 'spread the floor' which he reported felt better  Pt educated on lifting mechanics including holding the weight close. SL RDL: 2x4 B   Band walk with 13/16 band: 2x1 lap each with knees straight, knees bent, and monster walk Bike: 5'   SLR: x10 B  Bridge: x10  Marching bridge: 2x6   Hip abduction: x10 B   Side plank from knees: x5 B, x3 with 3 reps of clam each. Pt educated to add to home program.  Dead lift: x5x65#, 5x65# with squat lift, 5x65# with snatch   Sumo DL: 5x65# with cues to 'spread the floor'   SL RDL: 2x5 B   Side lunge: x5 B, x5 with slider  Hip adductor stretch: x2 contract relax in seated Bike: 5'   SLR: x10 B  Bridge: x10  Marching bridge: x5  Hip abduction: x10 B   Side plank from knees: x5 with 3 reps of clam each. SL RDL: x5 B  SL box squat: x6 B from 24.5\", x5 B from 20\"   Double leg vertical jump: x5 with education on hip hinge for landing  Double leg horizontal jump: x5, no pain  SL hop: 3x5 B with cues for limiting shank progression, sitting back, and sticking landing. Pt reported mild discomfort in hips with this. Slider lunges: 2x7 B posteriorly and laterally    Neuromuscular re-education                Manual Therapy:                Therapeutic Activities:                  Home program: 1/4/20: bridge, hip abduction, active lunge stretch, hip adductor stretch, SKTC    Med[employer] Portal  Treatment/Session Summary:    · Response to Treatment: Pt continues to progress well and was advanced to weight bearing SL exercises and hopping. He was educated to work on [employer] hopping at home over the weekend.    · Communication/Consultation:  None today  · Equipment provided today: None  · Recommendations/Intent for next treatment session: Next visit will focus on improving B hip and knee pain and strength.     Total Treatment Billable Duration:  40 minutes  PT Patient Time In/Time Out  Time In: 1227  Time Out: [facility] Jyoti Tello, PT, DPT    Future Appointments   Date Time Provider Andreina Anaya   1/19/2021  2:00 PM Phani [facility]   1/21/2021  2:00 PM Phani [facility]   1/26/2021  2:00 PM Phani [facility]   1/28/2021  2:00 PM Phani [facility]

## 2021-01-19 ENCOUNTER — HOSPITAL ENCOUNTER (OUTPATIENT)
Dept: PHYSICAL THERAPY | Age: 21
Discharge: HOME | End: 2021-01-19
Payer: COMMERCIAL

## 2021-01-19 PROCEDURE — 97530 THERAPEUTIC ACTIVITIES: CPT

## 2021-01-19 PROCEDURE — 97110 THERAPEUTIC EXERCISES: CPT

## 2021-01-19 NOTE — PROGRESS NOTES
Alexandra Jorge  : 2000  [facility]  Secondary:  [address] @ [address], [address] [place] [address]  [phone]   [phone]      OUTPATIENT PHYSICAL THERAPY: Daily Treatment Note 2021  Visit Count:  5    ICD-10: Treatment Diagnosis: Pain in right hip (M25.551), Stiffness of right hip, not elsewhere classified (M25.651), Pain in left hip (M25.552), Stiffness of left hip, not elsewhere classified (M25.652), Pain in right knee (M25.561) and Pain in left knee (M25.562)  TREATMENT PLAN:  Effective Dates: 2021 TO 2/3/2021 (30 days). Frequency/Duration: 2 times a week for 30 Day(s)  GOALS: (Goals have been discussed and agreed upon with patient.)  Short-Term Functional Goals: Time Frame: 2 weeks  # Goal Status   1 Pt will confirm compliance with home program to facilitate improvement in function. NEW     Discharge goals: Time frame: 4 weeks   # Goal Status   1 Pt will be able to tolerate sitting for at least 1 hour with no significant increase in symptoms to participate in ADLs such as desk work and reading. NEW   2 Pt will be able to pick objects up from ground without significant increase in symptoms to be able to clean. NEW   3 Pt will tolerate all LE active movements with no pain to be able to perform ADLs and functional mobility without limitations. NEW       Pre-treatment Symptoms/Complaints:  Pt reports his hips were a little sore over the weekend because he helped his dad move some things. He reports he has continued his exercises. Pain: Initial:   Pt reports his L his aches a little  Post Session:  Pt reported his legs feel sore  From exercise    Medications Last Reviewed: 2021  Updated Objective Findings: Below measures from initial evaluation unless otherwise noted. 21  Pain at worst: 5/10  Observation: Gait: WNL and no antalgic gait  ROM: B knee flexion and extension WNL.  B hip flexion and extension WNL, pain with B JANETTE and mild pain on R with hip IR in extension. Lower body ANDT: All tests WNL B  Strength: 5/5 throughout though mild pain with B knee extension and B hip IR. Pt reported SLR had a slight ache to it. Pt had B knee pain with deep squat. Palpation: B patella mobility WNL. LEFS: 70/80    TREATMENT:   GAIT TRAINING: (see chart for minutes):  Gait training to improve and/or restore physical functioning as related to mobility and balance. THERAPEUTIC ACTIVITY: (see chart for minutes): Therapeutic activities per grid below to improve mobility, strength, balance and coordination. Required minimal visual, verbal and tactile cues to improve independence with functional mobility and activities. THERAPEUTIC EXERCISE: (see chart for minutes):  Exercises per grid below to improve mobility, strength, balance and coordination. Required minimal visual, verbal and tactile cues to promote proper body alignment and promote proper body mechanics. Progressed resistance, range, repetitions and complexity of movement as indicated. NEUROMUSCULAR RE-EDUCATION: (see chart for minutes):  Exercise/activities per grid below to improve balance, coordination, kinesthetic sense, posture, pain, and proprioception. Required minimal visual, verbal and tactile cues to promote motor control of bilateral, lower extremity(s). MANUAL THERAPY: (see chart for minutes): Joint mobilization, Soft tissue mobilization, skin mobilization, and muscle energy techniques were utilized and necessary because of the patient's restricted joint motion, restricted motion of soft tissue and pain . MODALITIES: (see chart for minutes):      *  Cold Pack Therapy in order to provide analgesia.      Date: 1/4/21 (visit 1) 1/7/21 (visit 2)  1/12/21 (visit 3)  1/14/21 (visit 4) 1/19/21 (visit 5)    Modalities:                Therapeutic Exercise: 25 min 40 min 41 min 40 min 30 min    SLR: x5 B, x10 B  Bridge: x5, x10   SKTC: x1 hold B Hip abduction: x5, x10 B  Reverse clam: x5 B  Active lunge stretch: x3 B  Hip extension: x5   Hip adductor stretch: x1 hold B   Pt educated on taking standing breaks from prolonged sitting  Pt educated on home program implementation and given printout. Bike: 5'   SLR: x10 B  Bridge: x10  SL bridge: x5 B  Marching bridge: x5   Hip abduction: 2x10 B   Dead lift: 2x5x65#  Sumo DL: 2x5x65# with cues to 'spread the floor' which he reported felt better  Pt educated on lifting mechanics including holding the weight close. SL RDL: 2x4 B   Band walk with 13/16 band: 2x1 lap each with knees straight, knees bent, and monster walk Bike: 5'   SLR: x10 B  Bridge: x10  Marching bridge: 2x6   Hip abduction: x10 B   Side plank from knees: x5 B, x3 with 3 reps of clam each. Pt educated to add to home program.  Dead lift: x5x65#, 5x65# with squat lift, 5x65# with snatch   Sumo DL: 5x65# with cues to 'spread the floor'   SL RDL: 2x5 B   Side lunge: x5 B, x5 with slider  Hip adductor stretch: x2 contract relax in seated Bike: 5'   SLR: x10 B  Bridge: x10  Marching bridge: x5  Hip abduction: x10 B   Side plank from knees: x5 with 3 reps of clam each. SL RDL: x5 B  SL box squat: x6 B from 24.5\", x5 B from 20\"   Double leg vertical jump: x5 with education on hip hinge for landing  Double leg horizontal jump: x5, no pain  SL hop: 3x5 B with cues for limiting shank progression, sitting back, and sticking landing. Pt reported mild discomfort in hips with this. Slider lunges: 2x7 B posteriorly and laterally Bike: 5'   SLR: x10 B  Bridge: x10  Marching bridge: x10  Hip abduction: x10 B   Side plank from knees: x5 with 3 reps of clam each.   SL RDL: x5 B  SL box squat: x6 B from 24.5\"  Slider lunges: 2x8 B posteriorly and laterally   Neuromuscular re-education                Manual Therapy:                Therapeutic Activities:     10 min        Double leg vertical jump: x3  Double leg horizontal jump: x5  SL hop: 2x5 B  Running in volleyball gym: 2x2 laps with walking 1 lap in between. Pt reported no hip pain, just mild L knee discomfort      Home program: 1/4/20: bridge, hip abduction, active lunge stretch, hip adductor stretch, SKTC    Med[facility] Portal  Treatment/Session Summary:    · Response to Treatment: Pt tolerated running well today, with no hip pain and just mild L knee pain. He tolerated all exercises well and is improving his strength. · Communication/Consultation:  None today  · Equipment provided today: None  · Recommendations/Intent for next treatment session: Next visit will focus on improving B hip and knee pain and strength.     Total Treatment Billable Duration:  40 minutes  PT Patient Time In/Time Out  Time In: 1400  Time Out: [address] Pancho Frazier, PT, DPT    Future Appointments   Date Time Provider Andreina Anaya   1/21/2021  7:00 PM Ondina Alonzo [facility]   1/26/2021  2:00 PM Ondina Alonzo [facility]   1/28/2021  2:00 PM [facility]

## 2021-01-21 ENCOUNTER — APPOINTMENT (OUTPATIENT)
Dept: PHYSICAL THERAPY | Age: 21
End: 2021-01-21
Payer: COMMERCIAL

## 2021-01-26 ENCOUNTER — HOSPITAL ENCOUNTER (OUTPATIENT)
Dept: PHYSICAL THERAPY | Age: 21
Discharge: HOME | End: 2021-01-26
Payer: COMMERCIAL

## 2021-01-26 PROCEDURE — 97110 THERAPEUTIC EXERCISES: CPT

## 2021-01-26 PROCEDURE — 97530 THERAPEUTIC ACTIVITIES: CPT

## 2021-01-26 NOTE — PROGRESS NOTES
Heather Herrera  : 2000  [facility]  Secondary:  Laura Wood @ [facility]  [address] [facility]  [phone]   [phone]      OUTPATIENT PHYSICAL THERAPY: Daily Treatment Note 2021  Visit Count:  6    ICD-10: Treatment Diagnosis: Pain in right hip (M25.551), Stiffness of right hip, not elsewhere classified (M25.651), Pain in left hip (M25.552), Stiffness of left hip, not elsewhere classified (M25.652), Pain in right knee (M25.561) and Pain in left knee (M25.562)  TREATMENT PLAN:  Effective Dates: 2021 TO 2/3/2021 (30 days). Frequency/Duration: 2 times a week for 30 Day(s)  GOALS: (Goals have been discussed and agreed upon with patient.)  Short-Term Functional Goals: Time Frame: 2 weeks  # Goal Status   1 Pt will confirm compliance with home program to facilitate improvement in function. MET     Discharge goals: Time frame: 4 weeks   # Goal Status   1 Pt will be able to tolerate sitting for at least 1 hour with no significant increase in symptoms to participate in ADLs such as desk work and reading. MET   2 Pt will be able to pick objects up from ground without significant increase in symptoms to be able to clean. MET   3 Pt will tolerate all LE active movements with no pain to be able to perform ADLs and functional mobility without limitations. MET       Pre-treatment Symptoms/Complaints: Pt reports his hips have been feeling good and he has had no pain in them over past week. He reports he went to [employer] with his father Thursday and went for a long hike and had no pain. Pain: Initial:   Pt reports no pain Post Session:     Medications Last Reviewed: 2021  Updated Objective Findings: Below measures from initial evaluation unless otherwise noted. 21  Pain at worst: 5/10  Observation: Gait: WNL and no antalgic gait  ROM: B knee flexion and extension WNL.  B hip flexion and extension WNL, pain with B JANETTE and mild pain on R with hip IR in extension. Lower body ANDT: All tests WNL B  Strength: 5/5 throughout though mild pain with B knee extension and B hip IR. Pt reported SLR had a slight ache to it. Pt had B knee pain with deep squat. Palpation: B patella mobility WNL. LEFS: 70/80    1/26/21:   LEFS: 76/80    TREATMENT:   GAIT TRAINING: (see chart for minutes):  Gait training to improve and/or restore physical functioning as related to mobility and balance. THERAPEUTIC ACTIVITY: (see chart for minutes): Therapeutic activities per grid below to improve mobility, strength, balance and coordination. Required minimal visual, verbal and tactile cues to improve independence with functional mobility and activities. THERAPEUTIC EXERCISE: (see chart for minutes):  Exercises per grid below to improve mobility, strength, balance and coordination. Required minimal visual, verbal and tactile cues to promote proper body alignment and promote proper body mechanics. Progressed resistance, range, repetitions and complexity of movement as indicated. NEUROMUSCULAR RE-EDUCATION: (see chart for minutes):  Exercise/activities per grid below to improve balance, coordination, kinesthetic sense, posture, pain, and proprioception. Required minimal visual, verbal and tactile cues to promote motor control of bilateral, lower extremity(s). MANUAL THERAPY: (see chart for minutes): Joint mobilization, Soft tissue mobilization, skin mobilization, and muscle energy techniques were utilized and necessary because of the patient's restricted joint motion, restricted motion of soft tissue and pain . MODALITIES: (see chart for minutes):      *  Cold Pack Therapy in order to provide analgesia.      Date: 1/14/21 (visit 4) 1/19/21 (visit 5)  1/26/21 (visit 6)    Modalities:            Therapeutic Exercise: 40 min 30 min 30 min    Bike: 5'   SLR: x10 B  Bridge: x10  Marching bridge: x5  Hip abduction: x10 B   Side plank from knees: x5 with 3 reps of clam each. SL RDL: x5 B  SL box squat: x6 B from 24.5\", x5 B from 20\"   Double leg vertical jump: x5 with education on hip hinge for landing  Double leg horizontal jump: x5, no pain  SL hop: 3x5 B with cues for limiting shank progression, sitting back, and sticking landing. Pt reported mild discomfort in hips with this. Slider lunges: 2x7 B posteriorly and laterally Bike: 5'   SLR: x10 B  Bridge: x10  Marching bridge: x10  Hip abduction: x10 B   Side plank from knees: x5 with 3 reps of clam each. SL RDL: x5 B  SL box squat: x6 B from 24.5\"  Slider lunges: 2x8 B posteriorly and laterally Bike: 5'   SLR: 2x10 B  Bridge: x10  Marching bridge: x10  Bridge walkout: x6  Hip abduction: x10 B   Side plank from knees: x5 with 3 reps of hip abduction  SL RDL: x5 B  SL box squat: 2x5 B from 24.5\"  Slider lunges: 2x7 B posteriorly and laterally   Neuromuscular re-education            Manual Therapy:            Therapeutic Activities:  10 min 15 min     Double leg vertical jump: x3  Double leg horizontal jump: x5  SL hop: 2x5 B  Running in [employer]: 2x2 laps with walking 1 lap in between. Pt reported no hip pain, just mild L knee discomfort  Walking warm-up: 1 lap knee hug, 1 lap SLR, 1 lap quad stretch, 1 lap hip circles, 1 lap lunges, 1 lap L and R lunges each  Run/walk in [employer] gym: 2x1' run with 1-2 min walk in between with no pain     Home program: 1/4/20: bridge, hip abduction, active lunge stretch, hip adductor stretch, [facility]    [facility] Portal  Treatment/Session Summary:    · Response to Treatment: Pt has made significant improvement in B hip and knee strength and functional use. He is able to lift weights in the clinic and help his dad move furniture without pain, he does his exercises without pain, and was able to run in gym with no pain. He has met all his goals and is being discharged at this time.    · Communication/Consultation:  None today  · Equipment provided today: None  · Recommendations/Intent for next treatment session: Next visit will focus on improving B hip and knee pain and strength.     Total Treatment Billable Duration:  40 minutes  PT Patient Time In/Time Out  Time In: 1352  Time Out: [address] Beau Sam, PT, DPT

## 2021-01-28 ENCOUNTER — APPOINTMENT (OUTPATIENT)
Dept: PHYSICAL THERAPY | Age: 21
End: 2021-01-28
Payer: COMMERCIAL

## 2023-08-25 ENCOUNTER — APPOINTMENT (RX ONLY)
Dept: URBAN - NONMETROPOLITAN AREA CLINIC 1 | Facility: CLINIC | Age: 23
Setting detail: DERMATOLOGY
End: 2023-08-25

## 2023-08-25 DIAGNOSIS — L73.0 ACNE KELOID: ICD-10-CM | Status: INADEQUATELY CONTROLLED

## 2023-08-25 PROCEDURE — ? PRESCRIPTION MEDICATION MANAGEMENT

## 2023-08-25 PROCEDURE — ? COUNSELING

## 2023-08-25 PROCEDURE — 99204 OFFICE O/P NEW MOD 45 MIN: CPT

## 2023-08-25 PROCEDURE — ? MEDICATION COUNSELING

## 2023-08-25 PROCEDURE — ? PRESCRIPTION

## 2023-08-25 RX ORDER — DOXYCYCLINE HYCLATE 100 MG/1
CAPSULE, GELATIN COATED ORAL
Qty: 60 | Refills: 2 | Status: ERX | COMMUNITY
Start: 2023-08-25

## 2023-08-25 RX ORDER — CLOBETASOL PROPIONATE 0.5 MG/ML
SOLUTION TOPICAL QHS
Qty: 50 | Refills: 2 | Status: ERX | COMMUNITY
Start: 2023-08-25

## 2023-08-25 RX ORDER — CLINDAMYCIN PHOSPHATE 10 MG/ML
LOTION TOPICAL QD
Qty: 1 | Refills: 3 | Status: ERX | COMMUNITY
Start: 2023-08-25

## 2023-08-25 RX ADMIN — DOXYCYCLINE HYCLATE: 100 CAPSULE, GELATIN COATED ORAL at 00:00

## 2023-08-25 RX ADMIN — CLOBETASOL PROPIONATE: 0.5 SOLUTION TOPICAL at 00:00

## 2023-08-25 RX ADMIN — CLINDAMYCIN PHOSPHATE: 10 LOTION TOPICAL at 00:00

## 2023-08-25 ASSESSMENT — LOCATION ZONE DERM: LOCATION ZONE: NECK

## 2023-08-25 ASSESSMENT — LOCATION SIMPLE DESCRIPTION DERM: LOCATION SIMPLE: POSTERIOR NECK

## 2023-08-25 ASSESSMENT — LOCATION DETAILED DESCRIPTION DERM: LOCATION DETAILED: MID POSTERIOR NECK

## 2023-08-25 NOTE — PROCEDURE: MEDICATION COUNSELING
· Potassium 6 8 upon arrival  · Patient received insulin, bicarb, albuterol, calcium gluconate in the ED  · Potassium now improved to 5 1    9/22 - increasing again to 5 7, then 5 4  Most likely due to holding diuretics    Plan:  Administer additional dose of calcium gluconate Xolair Counseling:  Patient informed of potential adverse effects including but not limited to fever, muscle aches, rash and allergic reactions.  The patient verbalized understanding of the proper use and possible adverse effects of Xolair.  All of the patient's questions and concerns were addressed.

## 2023-08-25 NOTE — HPI: ACNE (PATIENT REPORTED)
Where Is Your Acne Located?: Back of head and back
Please List The Treatments That Have Worked Best For Your Acne: (Separate Each Entry With A Comma): Patient wants to be on accutane again but is traveling between [place] and [place]. Patient only comes back to [place] every 2-3 months. \\n\\nPatient just finished 200mg 1x daily for 2 months, 2 months ago that the [place] dermatologist put him on

## 2023-08-25 NOTE — PROCEDURE: PRESCRIPTION MEDICATION MANAGEMENT
Initiate Treatment: Doxycycline 100mg 1PO BID \\nClobetasol Solution AA neck BID x2 weeks then BID weekends only\\nClindamycin Lotion AA neck, chest, back BID
Plan: Patient warrants going on Accutane. Due to him traveling for school, we cannot start him in the office today. Patient will find a dermatologist in [place] and start accutane with them.
Detail Level: Zone
Render In Strict Bullet Format?: No

## 2024-09-19 NOTE — PROGRESS NOTES
Javon Ortega  : 2000  Primary: [facility]*  Secondary:  [address]  [phone]   [id number] [id number]      OUTPATIENT PHYSICAL THERAPY: Daily Treatment Note 2021  Visit Count:  2    ICD-10: Treatment Diagnosis: Pain in right hip (M25.551), Stiffness of right hip, not elsewhere classified (M25.651), Pain in left hip (M25.552), Stiffness of left hip, not elsewhere classified (M25.652), Pain in right knee (M25.561) and Pain in left knee (M25.562)  TREATMENT PLAN:  Effective Dates: 2021 TO 2/3/2021 (30 days). Frequency/Duration: 2 times a week for 30 Day(s)  GOALS: (Goals have been discussed and agreed upon with patient.)  Short-Term Functional Goals: Time Frame: 2 weeks  # Goal Status   1 Pt will confirm compliance with home program to facilitate improvement in function. NEW     Discharge goals: Time frame: 4 weeks   # Goal Status   1 Pt will be able to tolerate sitting for at least 1 hour with no significant increase in symptoms to participate in ADLs such as desk work and reading. NEW   2 Pt will be able to pick objects up from ground without significant increase in symptoms to be able to clean. NEW   3 Pt will tolerate all LE active movements with no pain to be able to perform ADLs and functional mobility without limitations. NEW       Pre-treatment Symptoms/Complaints:  Pt reports he has been doing exercises and that he also hasn't been sitting much. When he has to use his desk, he stands most of the time now. He also reports he has been going for walks around the neighborhood. Pain: Initial:   Pt reported some soreness in L anterior hip  Post Session:  No increase   Medications Last Reviewed: 2021  Updated Objective Findings: Below measures from initial evaluation unless otherwise noted.   21  Pain at worst: 5/10  Observation: Gait: WNL and no antalgic gait  ROM: B knee flexion and extension WNL. B hip flexion and extension WNL, pain with B JANETTE and mild pain on R with hip IR in extension. Lower body ANDT: All tests WNL B  Strength: 5/5 throughout though mild pain with B knee extension and B hip IR. Pt reported SLR had a slight ache to it. Pt had B knee pain with deep squat. Palpation: B patella mobility WNL. LEFS: 70/80    TREATMENT:   GAIT TRAINING: (see chart for minutes):  Gait training to improve and/or restore physical functioning as related to mobility and balance. THERAPEUTIC ACTIVITY: (see chart for minutes): Therapeutic activities per grid below to improve mobility, strength, balance and coordination. Required minimal visual, verbal and tactile cues to improve independence with functional mobility and activities. THERAPEUTIC EXERCISE: (see chart for minutes):  Exercises per grid below to improve mobility, strength, balance and coordination. Required minimal visual, verbal and tactile cues to promote proper body alignment and promote proper body mechanics. Progressed resistance, range, repetitions and complexity of movement as indicated. NEUROMUSCULAR RE-EDUCATION: (see chart for minutes):  Exercise/activities per grid below to improve balance, coordination, kinesthetic sense, posture, pain, and proprioception. Required minimal visual, verbal and tactile cues to promote motor control of bilateral, lower extremity(s). MANUAL THERAPY: (see chart for minutes): Joint mobilization, Soft tissue mobilization, skin mobilization, and muscle energy techniques were utilized and necessary because of the patient's restricted joint motion, restricted motion of soft tissue and pain . MODALITIES: (see chart for minutes):      *  Cold Pack Therapy in order to provide analgesia.      Date: 1/4/21 (visit 1) 1/7/21 (visit 2)       Modalities:                Therapeutic Exercise: 25 min 40 min       SLR: x5 B, x10 B  Bridge: x5, x10   SKTC: x1 hold B   Hip abduction: x5, x10 B  Reverse clam: x5 B  Active lunge stretch: x3 B  Hip extension: x5   Hip adductor stretch: x1 hold B   Pt educated on taking standing breaks from prolonged sitting  Pt educated on home program implementation and given printout. Bike: 5'   SLR: x10 B  Bridge: x10  SL bridge: x5 B  Marching bridge: x5   Hip abduction: 2x10 B   Dead lift: 2x5x65#  Sumo DL: 2x5x65# with cues to 'spread the floor' which he reported felt better  Pt educated on lifting mechanics including holding the weight close. SL RDL: 2x4 B   Band walk with 13/16 band: 2x1 lap each with knees straight, knees bent, and monster walk      Neuromuscular re-education                Manual Therapy:                Therapeutic Activities:                  Home program: 1/4/20: bridge, hip abduction, active lunge stretch, hip adductor stretch, SKTC    [facility] Portal  Treatment/Session Summary:    · Response to Treatment: Pt tolerated new exercises and advancements well. He was challenged with SL bridge and band walk. · Communication/Consultation:  None today  · Equipment provided today: None  · Recommendations/Intent for next treatment session: Next visit will focus on improving B hip and knee pain and strength.     Total Treatment Billable Duration:  40 minutes  PT Patient Time In/Time Out  Time In: 1400  Time Out: 800 Briana Gutiérrez, DPTOÑA    Future Appointments   Date Time Provider Andreina Anaya   1/12/2021  2:00 PM Kade Clemons [facility]   1/14/2021  2:00 PM Kade DAVEOFMATTHIEU [facility]   1/19/2021  2:00 PM Kade DAVE[facility]MATTHIEU [facility]   1/21/2021  2:00 PM Kade DAVEOFR [facility]   1/26/2021  2:00 PM Kade DAVEOFR [facility]   1/28/2021  2:00 PM Kade DAVEOFMATTHIEU [facility] Detail Level: Zone Plan: hold amlactin for now